# Patient Record
(demographics unavailable — no encounter records)

---

## 2024-12-06 NOTE — ASSESSMENT
[FreeTextEntry1] : Mr. Morgan is a 61-year-old man with history of carotid artery disease, hypertension, diabetes, and hyperlipidemia presenting for follow up.  Impression: (1) Severe mid RCA stenosis via CTA 2023 (70-80%), asymptomatic (2) Minimal non-obstructive CAD via CCTA 2020 (CAC 82, no significant stenosis), no anginal complaints (3) Thoracic aortic aneurysm, 4.2cm on CCTA 2020 vs 4.0cm CTA 2023 (4) Hypertension, well controlled at home (5) DM2, well controlled on Farxiga (6) Dyslipidemia, well controlled on rosuvastatin 40mg, LDL 42 (7) Lung nodules, per patient has followed up these findings from 2020 to resolution (8) Anterior MVP noted on TTE 1/2024  Plan: - Discussed his past cardiac imaging/testing in detail. - Discussed signs/symptoms of symptomatic carotid disease. He will continue to follow with Dr. Frank. His last carotid was today, results are pending. - Discussed his TAA with recommendations for annual monitoring via TTE or CT chest; emphasis on BP and HR control. - REpeat TTE to assess his mitral valve - Continue ASA, statin, ARB  RTC 6 months, sooner as needed

## 2024-12-06 NOTE — HISTORY OF PRESENT ILLNESS
[FreeTextEntry1] : Mr. Morgan is a 61-year-old man with history of carotid artery disease, hypertension, diabetes, and hyperlipidemia presenting for follow up.  He previously saw Dr. Diez 1/2024 for pre-operative risk assessment prior to orthopedic surgery. He saw Dr. Frank 12/2023; was recommended imaging surveillance.  TTE 1/2024: hyperdynamic LV, posterior leaflet prolapse Carotid Doppler 12/2023: prox R ICA >70% CTA Neck: 70-80% mid R ICA 70-80%, AscAo 4cm CCTA 2020: CAC 82 (72%ile), multifocal areas of calcified plaque without significant stenosis, right heart enlargement, ascending aorta 4.2mm, RLL nodules up to 6mm, CT chest in 6 months recommended  Labs: - TSH 2.46 - LDL 42, TG 97 - A1c 5.9%  Meds: - ASA 81mg - Rosuvastatin 40mg - Farxiga 10mg - Losartan 25mg - Tamsulosin

## 2024-12-13 NOTE — PHYSICAL EXAM
[de-identified] : Pleasant middle-age gentleman sits and stands comfortably my office in no distress.   Physical examination: Right shoulder: Active forward flexion of the shoulder is 250 degrees.  Abduction to 120 degrees.  With the shoulder at 90 degrees of abduction external rotation is 80 degrees internal rotation is 30 degrees.  He has no accidents.  Surgical incision is healed.  There is no erythema duration or fluctuance.  No axilla lymph nodes.  Negative impingement sign.  Graphs: Right shoulder (AP, internal rotation lateral, Y scapular): Well-find 3 fragment proximal humerus fracture.  Humeral head splits slightly high in the glenoid fossa.  No evidence of posttraumatic arthritis.  No evidence of avascular close of further hardware prominence.

## 2024-12-13 NOTE — HISTORY OF PRESENT ILLNESS
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  His surgery was complicated by a prominent screw which was removed in January 2024.  The patient has now returned to full activities.  He is quite happy the result.  He is lost a little bit of motion but overall is thrilled with the result.  Denies any sensory complaints.  Denies any fevers.

## 2024-12-13 NOTE — ASSESSMENT
[FreeTextEntry1] : 61-year-old gentleman 15-month status post a reduction droll fixation right proximal humerus fracture which is healed.  Function has been restored to a reasonable level.  Will have the patient follow-up on an as-needed basis going forward.  If he ever has any problems am happy to see him back at any time.  Encouraged to continue his self-directed exercise program.  All questions answered to his satisfaction and he agrees to the plan.

## 2025-02-21 NOTE — CARDIOLOGY SUMMARY
[de-identified] : 02/21/2025: NSR, (+) RBBB; unchanged from prior.  01/02/2024: NSR, (+) RBBB.  ======================================================= [de-identified] : TTE - 01/10/2025: CONCLUSIONS: 1. Left ventricular systolic function is normal with an ejection fraction of 65% by Rubio's method of disks. Normal left ventricular diastolic function. There are no regional wall motion abnormalities seen. 2. Normal right ventricular cavity size and normal right ventricular systolic function. 3. Normal left and right atrial size. 4. Mildly fibrocalcific aortic valve without stenosis. 5. No echocardiographic evidence of pulmonary hypertension. 6. No pericardial effusion seen. 7. The proximal ascending aorta is dilated, measuring 3.90 cm (indexed 1.97 cm/m). ======================================================= TTE - 01/02/2024: CONCLUSIONS: 1. Left ventricular cavity is normal. Left ventricular systolic function is normal with an ejection fraction of 77 % by Rubio's method of disks with an ejection fraction visually estimated at >75 %. 2. Normal left ventricular diastolic function. 3. Right atrium was not well visualized. 4. Normal left and right atrial size. 5. Pulmonic valve was not well visualized. 6. The aortic root and ascending aorta appear normal in size. 7. No pericardial effusion seen. 8. Structurally normal tricuspid valve with normal leaflet excursion. Mild tricuspid regurgitation. 9. Prolapse of the anterior mitral leaflet. 10. The interatrial septum appears intact. ======================================================= TTE - 02/28/2023: CONCLUSIONS: 1. The left ventricular systolic function is normal with an ejection fraction visually estimated at 65 to 70 %. 2. Normal atria. 3. Normal right ventricular size. 4. Normal aortic valve, without any evidence of aortic stenosis or regurgitation. 5. Mild tricuspid regurgitation. 6. Normal mitral valve structure and function. No mitral stenosis or significant regurgitation. 7. Systolic anterior motion of the mitral valve is seen without evidence of left ventricular outflow tract obstruction. 8. The pulmonary valve is normal in structure and function, with good leaflet excursion, and without any evidence of pulmonary stenosis or significant regurgitation. ======================================================= [de-identified] : Cardiac CT - 03/24/2023: IMPRESSION: -Short segment of mixed plaque in the mid right internal carotid artery resulting in severe (70-80%) stenosis.  -Calcified plaque identified in the bilateral carotid bulbs resulting in mild stenosis, as discussed. -Dilatation of the ascending thoracic aorta measuring 4 cm in short axis.  -Dilatation and circumferential thickening of the proximal esophagus partially imaged. Correlation with dedicated CT scan of the chest is recommended for further evaluation.  ======================================================= [de-identified] : Carotid Doppler - 12/08/2023: CONCLUSIONS: 1. Right: proximal ICA >70% stenosis. 2. Left: proximal ICA <50% stenosis. 3. Vertebral arteries: antegrade flow bilaterally. 4. Subclavian arteries: no significant atherosclerosis bilaterally. 5. Compared to the carotid ultrasound performed on 6/2/2023, the proximal right ICA PSV has increased from 218 cm/s to 250 cm/s. =======================================================

## 2025-02-21 NOTE — HISTORY OF PRESENT ILLNESS
[FreeTextEntry1] : BONNY LOVELL is a 60-year-old male, with a PMHx significant for carotid stenosis, HTN, HLD, and DM, who presents today for follow-up visit. Patient complains of chest pain with palpation of the chest wall. Worse with movement of the arm.  yes known

## 2025-02-21 NOTE — DISCUSSION/SUMMARY
[EKG obtained to assist in diagnosis and management of assessed problem(s)] : EKG obtained to assist in diagnosis and management of assessed problem(s) [FreeTextEntry1] : EKG performed today revealed NSR, (+) RBBB; unchanged from prior.  Chest Pain: Likely costochondritis. Recommend a trial of Voltaren gel, as well as Ibuprofen PRN.   Carotid Stenosis: There are no changes in medication management. Continue current medical therapy.  HTN: The impression is hypertension. Currently, the condition is controlled. There are no changes in medication management. Continue current medical therapy. Other planned treatments include an exercise regimen, weight loss, low sodium diet, and alcohol moderation.  HLD: The impression is hyperlipidemia. Currently, the condition is stable. There are no changes in medication management. Continue current medical therapy. Other planned treatment includes diet modification, exercise, and weight loss.  DM: The impression is diabetes mellitus. There are no changes in medication management. Patient advised to continue taking medications as prescribed, closely monitor blood sugar at home, follow a diabetic diet, and follow up for continued monitoring.  Plan was discussed with the patient.

## 2025-02-21 NOTE — PHYSICAL EXAM
Decrease your Toprol XL 50 mg to once daily for three day then decrease to 25 mg Daily. Watch \"That Sugar Film\" (2015) on [employer] prime. [Well Developed] : well developed [Well Nourished] : well nourished [No Acute Distress] : no acute distress [Normal Conjunctiva] : normal conjunctiva [Normal Venous Pressure] : normal venous pressure [No Carotid Bruit] : no carotid bruit [Normal S1, S2] : normal S1, S2 [No Murmur] : no murmur [No Rub] : no rub [No Gallop] : no gallop [Clear Lung Fields] : clear lung fields [Good Air Entry] : good air entry [No Respiratory Distress] : no respiratory distress  [Soft] : abdomen soft [Non Tender] : non-tender [No Masses/organomegaly] : no masses/organomegaly [Normal Bowel Sounds] : normal bowel sounds [Normal Gait] : normal gait [No Edema] : no edema [No Cyanosis] : no cyanosis [No Clubbing] : no clubbing [No Varicosities] : no varicosities [No Rash] : no rash [No Skin Lesions] : no skin lesions [Moves all extremities] : moves all extremities [No Focal Deficits] : no focal deficits [Normal Speech] : normal speech [Alert and Oriented] : alert and oriented [Normal memory] : normal memory

## 2025-06-09 NOTE — PHYSICAL EXAM
[General Appearance - Well Developed] : well developed [Normal Appearance] : normal appearance [Normal Conjunctiva] : the conjunctiva exhibited no abnormalities [Respiration, Rhythm And Depth] : normal respiratory rhythm and effort [Auscultation Breath Sounds / Voice Sounds] : lungs were clear to auscultation bilaterally [Heart Rate And Rhythm] : heart rate and rhythm were normal [Heart Sounds] : normal S1 and S2 [Murmurs] : no murmurs present [Arterial Pulses Normal] : the arterial pulses were normal [2+] : right 2+ [Abdomen Soft] : soft [Abnormal Walk] : normal gait [Abdomen Tenderness] : non-tender [Skin Color & Pigmentation] : normal skin color and pigmentation [] : no rash [No Venous Stasis] : no venous stasis [Oriented To Time, Place, And Person] : oriented to person, place, and time

## 2025-06-09 NOTE — PHYSICAL EXAM
[General Appearance - Well Developed] : well developed [Normal Appearance] : normal appearance [Normal Conjunctiva] : the conjunctiva exhibited no abnormalities [Respiration, Rhythm And Depth] : normal respiratory rhythm and effort [Auscultation Breath Sounds / Voice Sounds] : lungs were clear to auscultation bilaterally [Heart Rate And Rhythm] : heart rate and rhythm were normal [Heart Sounds] : normal S1 and S2 [Murmurs] : no murmurs present [Arterial Pulses Normal] : the arterial pulses were normal [2+] : right 2+ [Abdomen Soft] : soft [Abnormal Walk] : normal gait [Abdomen Tenderness] : non-tender [Skin Color & Pigmentation] : normal skin color and pigmentation [] : no rash [Oriented To Time, Place, And Person] : oriented to person, place, and time [No Venous Stasis] : no venous stasis

## 2025-06-11 NOTE — ASSESSMENT
[FreeTextEntry1] : 61 y/o M with PMHx of HTN, DLD, DM known R internal carotid artery stenosis.  #R ICA Stenosis LUMA > 70% asymtomatic  # Thoracic aneurysm 4cm 3/2023 # CAD non obstructive LAD 59 LCx 34 in 2020  # HLD, controlled  # DM   Plan: - carotid duplex today  - repeat CTA then will plan for possible LUMA stent  - C/w risk factor modification - BP well controlled-- elevated on arrival but improved on repeat 125/60  - C/w Losartan, Rosuvastatin - RTC in 3 months  I, Dr. Frank, personally performed the evaluation and management (E/M) services for this established patient who presents today with (a) new problem(s)/exacerbation of (an) existing condition(s). That E/M includes conducting the clinically appropriate interval history &/or exam, assessing all new/exacerbated conditions, and establishing a new plan of care. Today, Resident/fellow and Elena Hammonds, was here to observe &/or participate in the visit & follow plan of care established by me.

## 2025-06-11 NOTE — HISTORY OF PRESENT ILLNESS
[FreeTextEntry1] : 59 y/o M with PMHx of HTN, DLD, DM  presents for follow up. He is known to have  R internal carotid artery stenosis. Had a Carotid duplex in February -  R ICA 50-69% stenosis, L ICA 20-49% Subsequent CT Angio Neck revealed mixed plaque in mid R ICA 70-80% stenosis.  6/11 Returns to clinic no new complaints no dizziness lightheadedness. walking 1 mile no claudication.   Dr Diez cardio  ====Data reviewed today ==== labs 4/2025 TC 96 HDL 37 TG 74 LDL 44 A1c 6.6  VS CTA 3/2023 carotid revealed mixed plaque in mid R ICA 70-80% stenosis. Carotid duplex 12/2024 LUMA > 70% LICA < 50% antegrade flow bilateral  CTA LUMA 70-80%  Dilation ascending thoracic aorta 4 cm

## 2025-06-11 NOTE — REVIEW OF SYSTEMS
[Headache] : no headache [Blurry Vision] : no blurred vision [Vertigo] : no vertigo [Dyspnea on exertion] : not dyspnea during exertion [SOB] : no shortness of breath [Chest Discomfort] : no chest discomfort [Lower Ext Edema] : no extremity edema [Leg Claudication] : no intermittent leg claudication [Syncope] : no syncope [Cough] : no cough [Abdominal Pain] : no abdominal pain [Dizziness] : no dizziness [Numbness (Hypoesthesia)] : no numbness [Tingling (Paresthesia)] : no tingling [Weakness] : no weakness [Limb Weakness (Paresis)] : no limb weakness (Paresis) [Speech Disturbance] : no speech disturbance

## 2025-06-11 NOTE — HISTORY OF PRESENT ILLNESS
[FreeTextEntry1] : 61 y/o M with PMHx of HTN, DLD, DM  presents for follow up. He is known to have  R internal carotid artery stenosis. Had a Carotid duplex in February -  R ICA 50-69% stenosis, L ICA 20-49% Subsequent CT Angio Neck revealed mixed plaque in mid R ICA 70-80% stenosis.  6/11 Returns to clinic no new complaints no dizziness lightheadedness. walking 1 mile no claudication.   Dr Diez cardio  ====Data reviewed today ==== labs 4/2025 TC 96 HDL 37 TG 74 LDL 44 A1c 6.6  VS CTA 3/2023 carotid revealed mixed plaque in mid R ICA 70-80% stenosis. Carotid duplex 12/2024 LUMA > 70% LICA < 50% antegrade flow bilateral  CTA LUMA 70-80%  Dilation ascending thoracic aorta 4 cm

## 2025-06-11 NOTE — REVIEW OF SYSTEMS
[Headache] : no headache [Vertigo] : no vertigo [Blurry Vision] : no blurred vision [Dyspnea on exertion] : not dyspnea during exertion [SOB] : no shortness of breath [Lower Ext Edema] : no extremity edema [Chest Discomfort] : no chest discomfort [Leg Claudication] : no intermittent leg claudication [Cough] : no cough [Syncope] : no syncope [Abdominal Pain] : no abdominal pain [Dizziness] : no dizziness [Numbness (Hypoesthesia)] : no numbness [Tingling (Paresthesia)] : no tingling [Weakness] : no weakness [Limb Weakness (Paresis)] : no limb weakness (Paresis) [Speech Disturbance] : no speech disturbance

## 2025-06-20 NOTE — IMAGING
[de-identified] : Pleasant middle-age gentleman walks into my office with clear antalgic abnormal gait.  He is not in distress.  Physical examination: Right knee: Well-healed surgical scar.  No effusion.  Knee motion 5 to 110 degrees.  Has laxity with varus and valgus stress testing.  Varus deformity corrects with a valgus load.  Calf is soft without cords.  No Itzel lymph nodes or masses.  Right leg and ankle: Well-healed surgical scars medial aspect of leg.  Skin changes from old injury from necrotizing fasciitis.  3 vesicles are noted mid leg distal to knee incision scar.  Patient attributes these to skin irritation from his Ace bandage.  There is no surrounding erythema induration or real tenderness about this vesicles.  Calf is soft without cords.  No ankle or subtalar motion.  Overall alignment subtalar fusion is neutral.  Radiographs: Right knee (AP, lateral): Loose baseplate.  Secondary changes.  Calcifications noted in the posterior arterial system.  Right tibia (AP, lateral): Fused subtalar joint and ankle joints.  No erosive changes.  Acceptable alignment.

## 2025-06-20 NOTE — HISTORY OF PRESENT ILLNESS
[de-identified] : 62-year-old gentleman well-known to my office for open reduction droll fixation right proximal humerus fracture in October 2023 presents to my office for evaluation of increasing right knee pain.  He has extensive orthopedic injury history regarding his right lower extremity.  Previously much more overweight he sustained injury to his right leg which resulted in necrotizing fasciitis.  Serial debridements got control the infection but left him with advanced ankle and subtalar arthritis.  Years ago he underwent fusion of his ankle and subtalar joints with an Ilizarov off at the [facility] (Dr. Ogden).  This was successful but he subsequently went on to develop worsening right knee arthritis.  In 2018 he underwent right total knee replacement at [facility].  He was functioning reasonably well until recently (over the last 6 months or more he has developed increasing pain and a varus deformity of his knee.  Presents his office for evaluation of the pain.  The orthopedist who provided him with a knee replacement at [facility] has retired.  Review medical history: Type 2 diabetes; hemoglobin A1c around 6 Hypertension Hyperlipidemia BPH  Medications:  Low-dose aspirin Farxiga Losartan Rosuvastatin Tamsulosin Ozempic  NKDA  Social: Non-smoker rare social EtOH, no drug use Works as a podiatrist,

## 2025-07-03 NOTE — HISTORY OF PRESENT ILLNESS
[de-identified] : 62m for eval of rt knee had tka in 2018 nyu has been having pain and varus alignment one year prior xrays showed loosening esr and crp are wnl now plan is for revision We discussed the surgery, including a description of the surgery in layman's terms, preoperative evaluation, the hospital stay, anesthesia, and expected outcome. Models equivalent to the actual knee replacement prosthesis were used to demonstrate the magnitude and scope of the surgery.  Various modes of implant fixation including cement and biologic fixation were described.  The patient shared in the decision making and agreed to the use of implants.  The tibia and femur are resurfaced, ligament balance is restored, and the patella is addressed on a case by case basis, either resurfaced or preserved.   The patient will require a rolling walker for 2-4 weeks postoperativley due to gait instability to help with mobility related ADL's and a commode as they confined to a one level without access to a bathroom.  Patient is able to safely use the walker and functional mobility deficit can be sufficiently resolved with use of a walker.   Additionally surgical risks were discussed. The patient has a good understanding of the inherent risks of surgery which include bleeding, pain, and infection, blood clots, and any medical issues that may arise in the perioperative period.  Additionally, we discussed risks uniquely pertinent to knee replacement surgery, including arthrofibrosis, instability, loosening, numbness around the incision, nerve injury, and possible need for revision surgery should the replacement not function optimally.  All questions were answered and the patient verbalized understanding.  I encouraged the patient to contact me should any further questions or issues arise.

## 2025-07-03 NOTE — HISTORY OF PRESENT ILLNESS
[de-identified] : 62m for eval of rt knee had tka in 2018 nyu has been having pain and varus alignment one year prior xrays showed loosening esr and crp are wnl now plan is for revision We discussed the surgery, including a description of the surgery in layman's terms, preoperative evaluation, the hospital stay, anesthesia, and expected outcome. Models equivalent to the actual knee replacement prosthesis were used to demonstrate the magnitude and scope of the surgery.  Various modes of implant fixation including cement and biologic fixation were described.  The patient shared in the decision making and agreed to the use of implants.  The tibia and femur are resurfaced, ligament balance is restored, and the patella is addressed on a case by case basis, either resurfaced or preserved.   The patient will require a rolling walker for 2-4 weeks postoperativley due to gait instability to help with mobility related ADL's and a commode as they confined to a one level without access to a bathroom.  Patient is able to safely use the walker and functional mobility deficit can be sufficiently resolved with use of a walker.   Additionally surgical risks were discussed. The patient has a good understanding of the inherent risks of surgery which include bleeding, pain, and infection, blood clots, and any medical issues that may arise in the perioperative period.  Additionally, we discussed risks uniquely pertinent to knee replacement surgery, including arthrofibrosis, instability, loosening, numbness around the incision, nerve injury, and possible need for revision surgery should the replacement not function optimally.  All questions were answered and the patient verbalized understanding.  I encouraged the patient to contact me should any further questions or issues arise.

## 2025-07-14 NOTE — CARDIOLOGY SUMMARY
[de-identified] : 02/21/2025: NSR, (+) RBBB; unchanged from prior.  01/02/2024: NSR, (+) RBBB.  ======================================================= [de-identified] : TTE - 01/10/2025: CONCLUSIONS: 1. Left ventricular systolic function is normal with an ejection fraction of 65% by Rubio's method of disks. Normal left ventricular diastolic function. There are no regional wall motion abnormalities seen. 2. Normal right ventricular cavity size and normal right ventricular systolic function. 3. Normal left and right atrial size. 4. Mildly fibrocalcific aortic valve without stenosis. 5. No echocardiographic evidence of pulmonary hypertension. 6. No pericardial effusion seen. 7. The proximal ascending aorta is dilated, measuring 3.90 cm (indexed 1.97 cm/m). ======================================================= TTE - 01/02/2024: CONCLUSIONS: 1. Left ventricular cavity is normal. Left ventricular systolic function is normal with an ejection fraction of 77 % by Rubio's method of disks with an ejection fraction visually estimated at >75 %. 2. Normal left ventricular diastolic function. 3. Right atrium was not well visualized. 4. Normal left and right atrial size. 5. Pulmonic valve was not well visualized. 6. The aortic root and ascending aorta appear normal in size. 7. No pericardial effusion seen. 8. Structurally normal tricuspid valve with normal leaflet excursion. Mild tricuspid regurgitation. 9. Prolapse of the anterior mitral leaflet. 10. The interatrial septum appears intact. ======================================================= TTE - 02/28/2023: CONCLUSIONS: 1. The left ventricular systolic function is normal with an ejection fraction visually estimated at 65 to 70 %. 2. Normal atria. 3. Normal right ventricular size. 4. Normal aortic valve, without any evidence of aortic stenosis or regurgitation. 5. Mild tricuspid regurgitation. 6. Normal mitral valve structure and function. No mitral stenosis or significant regurgitation. 7. Systolic anterior motion of the mitral valve is seen without evidence of left ventricular outflow tract obstruction. 8. The pulmonary valve is normal in structure and function, with good leaflet excursion, and without any evidence of pulmonary stenosis or significant regurgitation. ======================================================= [de-identified] : Cardiac CT - 03/24/2023: IMPRESSION: -Short segment of mixed plaque in the mid right internal carotid artery resulting in severe (70-80%) stenosis.  -Calcified plaque identified in the bilateral carotid bulbs resulting in mild stenosis, as discussed. -Dilatation of the ascending thoracic aorta measuring 4 cm in short axis.  -Dilatation and circumferential thickening of the proximal esophagus partially imaged. Correlation with dedicated CT scan of the chest is recommended for further evaluation.  ======================================================= [de-identified] : Carotid Doppler - 12/08/2023: CONCLUSIONS: 1. Right: proximal ICA >70% stenosis. 2. Left: proximal ICA <50% stenosis. 3. Vertebral arteries: antegrade flow bilaterally. 4. Subclavian arteries: no significant atherosclerosis bilaterally. 5. Compared to the carotid ultrasound performed on 6/2/2023, the proximal right ICA PSV has increased from 218 cm/s to 250 cm/s. =======================================================

## 2025-07-14 NOTE — DISCUSSION/SUMMARY
[EKG obtained to assist in diagnosis and management of assessed problem(s)] : EKG obtained to assist in diagnosis and management of assessed problem(s) [FreeTextEntry1] : Preoperative Cardiac Evaluation: Patient is medically optimized. The patient is an intermediate-risk patient for an intermediate-risk procedure and can proceed with surgery as planned without further cardiac intervention. May discontinue antiplatelet 1 week prior to surgery.   Carotid Stenosis: There are no changes in medication management. Continue current medical therapy.  HTN: Currently, the condition is controlled. There are no changes in medication management. Continue current medical therapy. Other planned treatments include an exercise regimen, weight loss, low sodium diet, and alcohol moderation.  HLD: Currently, the condition is stable. There are no changes in medication management. Continue current medical therapy. Other planned treatment includes diet modification, exercise, and weight loss.  DM: The impression is diabetes mellitus. There are no changes in medication management. Patient advised to continue taking medications as prescribed, closely monitor blood sugar at home, follow a diabetic diet, and follow up for continued monitoring.  Plan was discussed with the patient.

## 2025-07-14 NOTE — HISTORY OF PRESENT ILLNESS
[FreeTextEntry1] : BONNY LOVELL is a 62-year-old male, with a PMHx significant for carotid stenosis, HTN, HLD, and DM, who presents today for follow-up visit. Patient is planning for a right knee revision. Had a coronary CT which was nonobstructive. Otherwise: (-) chest pain, (-) SOB.

## 2025-07-14 NOTE — CARDIOLOGY SUMMARY
[de-identified] : 02/21/2025: NSR, (+) RBBB; unchanged from prior.  01/02/2024: NSR, (+) RBBB.  ======================================================= [de-identified] : TTE - 01/10/2025: CONCLUSIONS: 1. Left ventricular systolic function is normal with an ejection fraction of 65% by Rubio's method of disks. Normal left ventricular diastolic function. There are no regional wall motion abnormalities seen. 2. Normal right ventricular cavity size and normal right ventricular systolic function. 3. Normal left and right atrial size. 4. Mildly fibrocalcific aortic valve without stenosis. 5. No echocardiographic evidence of pulmonary hypertension. 6. No pericardial effusion seen. 7. The proximal ascending aorta is dilated, measuring 3.90 cm (indexed 1.97 cm/m). ======================================================= TTE - 01/02/2024: CONCLUSIONS: 1. Left ventricular cavity is normal. Left ventricular systolic function is normal with an ejection fraction of 77 % by Rubio's method of disks with an ejection fraction visually estimated at >75 %. 2. Normal left ventricular diastolic function. 3. Right atrium was not well visualized. 4. Normal left and right atrial size. 5. Pulmonic valve was not well visualized. 6. The aortic root and ascending aorta appear normal in size. 7. No pericardial effusion seen. 8. Structurally normal tricuspid valve with normal leaflet excursion. Mild tricuspid regurgitation. 9. Prolapse of the anterior mitral leaflet. 10. The interatrial septum appears intact. ======================================================= TTE - 02/28/2023: CONCLUSIONS: 1. The left ventricular systolic function is normal with an ejection fraction visually estimated at 65 to 70 %. 2. Normal atria. 3. Normal right ventricular size. 4. Normal aortic valve, without any evidence of aortic stenosis or regurgitation. 5. Mild tricuspid regurgitation. 6. Normal mitral valve structure and function. No mitral stenosis or significant regurgitation. 7. Systolic anterior motion of the mitral valve is seen without evidence of left ventricular outflow tract obstruction. 8. The pulmonary valve is normal in structure and function, with good leaflet excursion, and without any evidence of pulmonary stenosis or significant regurgitation. ======================================================= [de-identified] : Cardiac CT - 03/24/2023: IMPRESSION: -Short segment of mixed plaque in the mid right internal carotid artery resulting in severe (70-80%) stenosis.  -Calcified plaque identified in the bilateral carotid bulbs resulting in mild stenosis, as discussed. -Dilatation of the ascending thoracic aorta measuring 4 cm in short axis.  -Dilatation and circumferential thickening of the proximal esophagus partially imaged. Correlation with dedicated CT scan of the chest is recommended for further evaluation.  ======================================================= [de-identified] : Carotid Doppler - 12/08/2023: CONCLUSIONS: 1. Right: proximal ICA >70% stenosis. 2. Left: proximal ICA <50% stenosis. 3. Vertebral arteries: antegrade flow bilaterally. 4. Subclavian arteries: no significant atherosclerosis bilaterally. 5. Compared to the carotid ultrasound performed on 6/2/2023, the proximal right ICA PSV has increased from 218 cm/s to 250 cm/s. =======================================================